# Patient Record
Sex: MALE | Race: WHITE | NOT HISPANIC OR LATINO | Employment: OTHER | URBAN - METROPOLITAN AREA
[De-identification: names, ages, dates, MRNs, and addresses within clinical notes are randomized per-mention and may not be internally consistent; named-entity substitution may affect disease eponyms.]

---

## 2019-11-18 ENCOUNTER — OFFICE VISIT (OUTPATIENT)
Dept: OPHTHALMOLOGY | Facility: CLINIC | Age: 69
End: 2019-11-18
Payer: MEDICARE

## 2019-11-18 DIAGNOSIS — E11.9 DIABETES MELLITUS TYPE 2 WITHOUT RETINOPATHY: Primary | ICD-10-CM

## 2019-11-18 DIAGNOSIS — H52.4 BILATERAL PRESBYOPIA: ICD-10-CM

## 2019-11-18 DIAGNOSIS — H25.13 CATARACT, NUCLEAR SCLEROTIC SENILE, BILATERAL: ICD-10-CM

## 2019-11-18 DIAGNOSIS — H52.03 HYPEROPIA, BILATERAL: ICD-10-CM

## 2019-11-18 PROCEDURE — 99999 PR PBB SHADOW E&M-NEW PATIENT-LVL I: ICD-10-PCS | Mod: PBBFAC,,, | Performed by: OPTOMETRIST

## 2019-11-18 PROCEDURE — 92015 PR REFRACTION: ICD-10-PCS | Mod: S$GLB,,, | Performed by: OPTOMETRIST

## 2019-11-18 PROCEDURE — 92004 PR EYE EXAM, NEW PATIENT,COMPREHESV: ICD-10-PCS | Mod: S$GLB,,, | Performed by: OPTOMETRIST

## 2019-11-18 PROCEDURE — 99999 PR PBB SHADOW E&M-NEW PATIENT-LVL I: CPT | Mod: PBBFAC,,, | Performed by: OPTOMETRIST

## 2019-11-18 PROCEDURE — 92004 COMPRE OPH EXAM NEW PT 1/>: CPT | Mod: S$GLB,,, | Performed by: OPTOMETRIST

## 2019-11-18 PROCEDURE — 92015 DETERMINE REFRACTIVE STATE: CPT | Mod: S$GLB,,, | Performed by: OPTOMETRIST

## 2019-11-18 RX ORDER — METFORMIN HYDROCHLORIDE 1000 MG/1
1000 TABLET ORAL
COMMUNITY
Start: 2019-01-28

## 2019-11-18 RX ORDER — MULTIVITAMIN
1 TABLET ORAL
COMMUNITY

## 2019-11-18 RX ORDER — ATORVASTATIN CALCIUM 40 MG/1
40 TABLET, FILM COATED ORAL
COMMUNITY
Start: 2019-01-30

## 2019-11-18 RX ORDER — METOPROLOL SUCCINATE 25 MG/1
25 TABLET, EXTENDED RELEASE ORAL
COMMUNITY
Start: 2019-07-30 | End: 2020-07-29

## 2019-11-18 RX ORDER — TAMSULOSIN HYDROCHLORIDE 0.4 MG/1
0.4 CAPSULE ORAL
COMMUNITY
Start: 2019-01-22

## 2019-11-18 RX ORDER — OMEPRAZOLE 40 MG/1
40 CAPSULE, DELAYED RELEASE ORAL
COMMUNITY
Start: 2019-11-07

## 2019-11-18 RX ORDER — DIPHENHYDRAMINE HCL 25 MG
25 CAPSULE ORAL EVERY 6 HOURS PRN
COMMUNITY

## 2019-11-18 RX ORDER — TEMAZEPAM 15 MG/1
15 CAPSULE ORAL
COMMUNITY
Start: 2018-10-31

## 2019-11-18 RX ORDER — FINASTERIDE 5 MG/1
5 TABLET, FILM COATED ORAL
COMMUNITY
Start: 2019-01-22

## 2019-11-18 RX ORDER — LORAZEPAM 0.5 MG/1
TABLET ORAL
COMMUNITY
Start: 2019-07-29

## 2019-11-18 RX ORDER — LISINOPRIL 5 MG/1
5 TABLET ORAL
COMMUNITY
Start: 2019-01-31 | End: 2020-01-26

## 2019-11-18 NOTE — PROGRESS NOTES
HPI     NIDDM exam.  Patient lost glasses this weekend.  New patient last eye exam 12/2018.  Update glasses RX.    Last edited by Panda Pena, OD on 11/18/2019  3:47 PM. (History)            Assessment /Plan     For exam results, see Encounter Report.    Diabetes mellitus type 2 without retinopathy    Cataract, nuclear sclerotic senile, bilateral    Hyperopia, bilateral    Bilateral presbyopia      No Background Diabetic Retinopathy    Minimal cataracts OU, not surgical    Dispense Final Rx for glasses.  RTC 1 year  Discussed above and answered questions.

## 2022-11-22 ENCOUNTER — PATIENT MESSAGE (OUTPATIENT)
Dept: RESEARCH | Facility: HOSPITAL | Age: 72
End: 2022-11-22
Payer: MEDICARE

## 2024-02-15 NOTE — PROGRESS NOTES
[facility] / [facility] - Radiation Oncology Follow Up Note    Requesting physician:  Negrito Simon MD    HISTORY OF PRESENT ILLNESS:  C61:  71-year-old man with a host of non immediately life-threatening medical problems presenting with a newly diagnosed high-risk T2a N0 M0 prostate cancer with a corrected PSA of 6.6 and 8/12 cores malignant with PMI, including a single core of 4+4, 2 cores of 4+3, 3 cores of 3+4, and 2 cores of 3+3.  Gland volume estimated at 45 cc and he presented with good to moderate urinary function on longstanding twice daily Flomax and Proscar.  Due to severe claustrophobia, MRI was not performed.    Endocrine therapy was initiated by Dr. Simon on 12/14/2021 and he completed external beam radiotherapy on 04/01/2022.  He declined additional endocrine therapy as his 1st six-month shot was wearing off, describing side effects including hot flashes, weight gain, fatigue, and ED. in retrospect, his fatigue was likely related to anemia due to bleeding about his hiatal hernia, repaired in early 2023 04/01/2022 radiotherapy completed   06/14/2022 six-month of endocrine therapy completed, declined additional recommended endocrine therapy  0.16 11/01/2022 testosterone 370  0.20 05/03/2023  0.15 11/17/2023    INTERVAL HISTORY:  He returns for routine six-month follow up.  The continues to take Flomax twice daily (briefly went down to once daily due to orthostasis, now known to be due to his GI bleed/anemia, now resolved) and describes stable function including diminished but adequate stream, nocturia 1-3 times per evening, and frequent loose stool, often with rosina diarrhea, related to his prior history of colectomy and predating his prostate cancer diagnosis.  He denies new bone pain.  He has persistent very poor erectile function that has failed to respond to medical intervention.  He denies any other interval change or complaint    PSA continues to decline as shown  "above      PHYSICAL EXAMINATION:  Vitals:    02/16/24 1111   BP: 134/75   Pulse: (!) 59   Temp: 97.5 °F (36.4 °C)   TempSrc: Temporal   SpO2: 98%   Weight: 78.5 kg (173 lb 1 oz)   Height: 5' 9" (1.753 m)      General:  A&O x4, NAD  HEENT:  PEERLA, CN II-XII intact, EOM intact,   Lymphatics:  no cervical/sclav LAD  Lungs:  CTAB  Heart:  RRR  Abdomen:  NTND +BS      ASSESSMENT:  Clinically and biochemically AMY      PLAN:  Follow up in 6 months.  A PSA was drawn today.  He is uncertain of his next follow up with Dr. Simon        "

## 2024-02-16 ENCOUNTER — LAB VISIT (OUTPATIENT)
Dept: LAB | Facility: HOSPITAL | Age: 74
End: 2024-02-16
Attending: SPECIALIST
Payer: MEDICARE

## 2024-02-16 ENCOUNTER — OFFICE VISIT (OUTPATIENT)
Dept: RADIATION ONCOLOGY | Facility: CLINIC | Age: 74
End: 2024-02-16
Payer: MEDICARE

## 2024-02-16 VITALS
SYSTOLIC BLOOD PRESSURE: 134 MMHG | DIASTOLIC BLOOD PRESSURE: 75 MMHG | HEIGHT: 69 IN | HEART RATE: 59 BPM | WEIGHT: 173.06 LBS | TEMPERATURE: 98 F | OXYGEN SATURATION: 98 % | BODY MASS INDEX: 25.63 KG/M2

## 2024-02-16 DIAGNOSIS — C61 ADENOCARCINOMA OF PROSTATE: Primary | ICD-10-CM

## 2024-02-16 DIAGNOSIS — C61 ADENOCARCINOMA OF PROSTATE: ICD-10-CM

## 2024-02-16 PROCEDURE — 36415 COLL VENOUS BLD VENIPUNCTURE: CPT | Performed by: SPECIALIST

## 2024-02-16 PROCEDURE — 99999 PR PBB SHADOW E&M-EST. PATIENT-LVL III: CPT | Mod: PBBFAC,,, | Performed by: SPECIALIST

## 2024-02-16 PROCEDURE — 99213 OFFICE O/P EST LOW 20 MIN: CPT | Mod: S$GLB,,, | Performed by: SPECIALIST

## 2024-02-16 PROCEDURE — 1126F AMNT PAIN NOTED NONE PRSNT: CPT | Mod: CPTII,S$GLB,, | Performed by: SPECIALIST

## 2024-02-16 PROCEDURE — 3008F BODY MASS INDEX DOCD: CPT | Mod: CPTII,S$GLB,, | Performed by: SPECIALIST

## 2024-02-16 PROCEDURE — 1159F MED LIST DOCD IN RCRD: CPT | Mod: CPTII,S$GLB,, | Performed by: SPECIALIST

## 2024-02-16 PROCEDURE — 3075F SYST BP GE 130 - 139MM HG: CPT | Mod: CPTII,S$GLB,, | Performed by: SPECIALIST

## 2024-02-16 PROCEDURE — 1101F PT FALLS ASSESS-DOCD LE1/YR: CPT | Mod: CPTII,S$GLB,, | Performed by: SPECIALIST

## 2024-02-16 PROCEDURE — 3078F DIAST BP <80 MM HG: CPT | Mod: CPTII,S$GLB,, | Performed by: SPECIALIST

## 2024-02-16 PROCEDURE — 84153 ASSAY OF PSA TOTAL: CPT | Performed by: SPECIALIST

## 2024-02-16 PROCEDURE — 3288F FALL RISK ASSESSMENT DOCD: CPT | Mod: CPTII,S$GLB,, | Performed by: SPECIALIST

## 2024-02-17 LAB — COMPLEXED PSA SERPL-MCNC: 0.12 NG/ML (ref 0–4)

## 2024-08-16 ENCOUNTER — OFFICE VISIT (OUTPATIENT)
Dept: RADIATION ONCOLOGY | Facility: CLINIC | Age: 74
End: 2024-08-16
Payer: MEDICARE

## 2024-08-16 VITALS
SYSTOLIC BLOOD PRESSURE: 123 MMHG | DIASTOLIC BLOOD PRESSURE: 68 MMHG | HEIGHT: 69 IN | TEMPERATURE: 98 F | RESPIRATION RATE: 18 BRPM | WEIGHT: 166.25 LBS | BODY MASS INDEX: 24.62 KG/M2 | HEART RATE: 82 BPM | OXYGEN SATURATION: 98 %

## 2024-08-16 DIAGNOSIS — C61 ADENOCARCINOMA OF PROSTATE: Primary | ICD-10-CM

## 2024-08-16 PROCEDURE — 99999 PR PBB SHADOW E&M-EST. PATIENT-LVL IV: CPT | Mod: PBBFAC,,, | Performed by: SPECIALIST

## 2024-08-16 RX ORDER — CARVEDILOL 3.12 MG/1
3.12 TABLET ORAL 2 TIMES DAILY
COMMUNITY
Start: 2024-08-15

## 2024-08-16 NOTE — PROGRESS NOTES
[facility] / [facility] - Radiation Oncology Follow Up Note    Requesting physician:  Negrito Simon MD     HISTORY OF PRESENT ILLNESS:  C61:  71-year-old man with a host of non immediately life-threatening medical problems presenting with a newly diagnosed high-risk T2a N0 M0 prostate cancer with a corrected PSA of 6.6 and 8/12 cores malignant with PMI, including a single core of 4+4, 2 cores of 4+3, 3 cores of 3+4, and 2 cores of 3+3.  Gland volume estimated at 45 cc and he presented with good to moderate urinary function on longstanding twice daily Flomax and Proscar.  Due to severe claustrophobia, MRI was not performed.     Endocrine therapy was initiated by Dr. Simon on 12/14/2021 and he completed external beam radiotherapy on 04/01/2022.  He declined additional endocrine therapy as his 1st six-month shot was wearing off, describing side effects including hot flashes, weight gain, fatigue, and ED. in retrospect, his fatigue was likely related to anemia due to bleeding about his hiatal hernia, repaired in early 2023 04/01/2022      radiotherapy completed              06/14/2022      six-month of endocrine therapy completed, declined additional recommended endocrine therapy  0.16 11/01/2022 testosterone 370  0.20 05/03/2023  0.15 11/17/2023  0.12 02/16/2024  0.12 06/13/2024      INTERVAL HISTORY:  He returns for routine six-month follow up.  He continues to take Flomax b.i.d. with stable very good urinary function including occasional diminished but adequate stream, occasional non bothersome hesitancy, rare low volume urge incontinence not requiring a change of shorts or use of a pad, and nocturia 1-3 times per evening.  He does have ongoing intermittent difficulty with nephrolithiasis.  He otherwise denies hematuria, dysuria, daytime frequency less than 2 hours, or urgency.  He has no new bone pain or GI complaints.  He has persistent ED    Ongoing PSA response as shown  "above      PHYSICAL EXAMINATION:  Vitals:    08/16/24 1058   BP: 123/68   Pulse: 82   Resp: 18   Temp: 98.1 °F (36.7 °C)   SpO2: 98%   Weight: 75.4 kg (166 lb 3.6 oz)   Height: 5' 9" (1.753 m)      General:  A&O x4, NAD  HEENT:  PEERLA, CN II-XII intact, EOM intact,   Lymphatics:  no cervical/sclav LAD  Lungs:  CTAB  Heart:  RRR  Abdomen:  NTND +BS, no HSM      ASSESSMENT:  Clinically and biochemically AMY      PLAN:  Follow up in 6 months.  He sees Dr. Simon in the next few weeks to discuss endoscopic management of a large kidney stone      "

## 2025-02-18 ENCOUNTER — OFFICE VISIT (OUTPATIENT)
Dept: RADIATION ONCOLOGY | Facility: CLINIC | Age: 75
End: 2025-02-18
Payer: MEDICARE

## 2025-02-18 VITALS
SYSTOLIC BLOOD PRESSURE: 138 MMHG | BODY MASS INDEX: 25.67 KG/M2 | TEMPERATURE: 98 F | HEART RATE: 67 BPM | RESPIRATION RATE: 18 BRPM | HEIGHT: 69 IN | WEIGHT: 173.31 LBS | OXYGEN SATURATION: 96 % | DIASTOLIC BLOOD PRESSURE: 82 MMHG

## 2025-02-18 DIAGNOSIS — C61 ADENOCARCINOMA OF PROSTATE: Primary | ICD-10-CM

## 2025-02-18 NOTE — PROGRESS NOTES
[facility] - Radiation Oncology Follow Up Note    Requesting physician:  Negrito Simon MD     HISTORY OF PRESENT ILLNESS:  C61:  71-year-old man with a host of non immediately life-threatening medical problems presenting with a newly diagnosed high-risk T2a N0 M0 prostate cancer with a corrected PSA of 6.6 and 8/12 cores malignant with PMI, including a single core of 4+4, 2 cores of 4+3, 3 cores of 3+4, and 2 cores of 3+3.  Gland volume estimated at 45 cc and he presented with good to moderate urinary function on longstanding twice daily Flomax and Proscar.  Due to severe claustrophobia, MRI was not performed.     Endocrine therapy was initiated by Dr. Simon on 12/14/2021 and he completed external beam radiotherapy on 04/01/2022.  He declined additional endocrine therapy as his 1st six-month shot was wearing off, describing side effects including hot flashes, weight gain, fatigue, and ED. in retrospect, his fatigue was likely related to anemia due to bleeding about his hiatal hernia, repaired in early 2023 04/01/2022      radiotherapy completed              06/14/2022      six-month of endocrine therapy completed, declined additional recommended endocrine therapy  0.16 11/01/2022 testosterone 370  0.20 05/03/2023  0.15 11/17/2023  0.12     02/16/2024  0.12     06/13/2024  0.15 01/31/2025     INTERVAL HISTORY:  He returns for routine six-month follow up.  He describes stable urinary function with ongoing twice daily Flomax including occasionally diminished but adequate stream, rare modest stable dysuria, nocturia 2-3 times per evening, and denies hesitancy, hematuria, daytime urgency, daytime frequency less than 2 hours, or incontinence.  He has no new bone pain or GI complaints.  He has persistent ED    PSA has remained persistently low as shown above, most recently measured in Dr. Simon's clinic      PHYSICAL EXAMINATION:  Vitals:    02/18/25 1002   BP: 138/82  "  Pulse: 67   Resp: 18   Temp: 97.7 °F (36.5 °C)   SpO2: 96%   Weight: 78.6 kg (173 lb 4.5 oz)   Height: 5' 9" (1.753 m)      General:  A&O x4, NAD  HEENT:  PEERLA, CN II-XII intact, EOM intact,   Lymphatics:  no cervical/sclav LAD  Lungs:  CTAB  Heart:  RRR  Abdomen:  NTND +BS, no HSM      ASSESSMENT:  Clinically and biochemically AMY      PLAN:  No intervention indicated at this time.  He continues to see Dr. Simon twice yearly with PSA.  He will follow up here in 1 year        "